# Patient Record
Sex: FEMALE | Race: OTHER | URBAN - METROPOLITAN AREA
[De-identification: names, ages, dates, MRNs, and addresses within clinical notes are randomized per-mention and may not be internally consistent; named-entity substitution may affect disease eponyms.]

---

## 2021-06-10 ENCOUNTER — TELEPHONE (OUTPATIENT)
Dept: FAMILY MEDICINE CLINIC | Age: 31
End: 2021-06-10

## 2021-06-10 ENCOUNTER — VIRTUAL VISIT (OUTPATIENT)
Dept: FAMILY MEDICINE CLINIC | Age: 31
End: 2021-06-10

## 2021-06-10 DIAGNOSIS — Z71.89 COUNSELING AND COORDINATION OF CARE: Primary | ICD-10-CM

## 2021-06-10 DIAGNOSIS — Z53.21 PATIENT LEFT WITHOUT BEING SEEN: Primary | ICD-10-CM

## 2021-06-10 NOTE — PROGRESS NOTES
Patient called [facility] asking for an appointment. Patient has recently moved to the area and did not know about the [facility] process, OW's work cell phone number has been given by a friend. Since there was no answer on the main number, OW called Dolly DELVALLE to ask for assistance to schedule mom and son for a VV.

## 2021-06-10 NOTE — TELEPHONE ENCOUNTER
We tried to reach patient for her appointment. She did not answer, I left a message. She did call back, and stated that she is still at work, and couldn't get off as she thought she would. She was asking for an appointment after 5:00 p.m today for her and her son, or an RX without a visit. She was advised to call another day, when she is able to either come in or do a televisit. I offered different options for the next day or the following week. I also called her twice to offer an evening appointment with Dr. Enedeila Iglesias, but she did not asnwer.

## 2021-06-14 ENCOUNTER — VIRTUAL VISIT (OUTPATIENT)
Dept: FAMILY MEDICINE CLINIC | Age: 31
End: 2021-06-14

## 2021-06-14 DIAGNOSIS — B86 SCABIES: Primary | ICD-10-CM

## 2021-06-14 PROCEDURE — 99442 PR PHYS/QHP TELEPHONE EVALUATION 11-20 MIN: CPT | Performed by: PHYSICIAN ASSISTANT

## 2021-06-14 RX ORDER — PERMETHRIN 50 MG/G
CREAM TOPICAL
Qty: 60 G | Refills: 0 | Status: SHIPPED | OUTPATIENT
Start: 2021-06-14 | End: 2021-06-14 | Stop reason: SDUPTHER

## 2021-06-14 RX ORDER — PERMETHRIN 50 MG/G
CREAM TOPICAL
Qty: 60 G | Refills: 0 | Status: SHIPPED | OUTPATIENT
Start: 2021-06-14 | End: 2021-07-02 | Stop reason: SDUPTHER

## 2021-06-14 RX ORDER — FEXOFENADINE HCL AND PSEUDOEPHEDRINE HCI 60; 120 MG/1; MG/1
1 TABLET, EXTENDED RELEASE ORAL EVERY 12 HOURS
COMMUNITY
End: 2021-07-02

## 2021-06-14 NOTE — PROGRESS NOTES
Check-out Note: Nat,   Please go over goodrx, how to launder toys, bedding, clothes etc    AMN Int # [id number]. Tc to the pt and Scabies treatment and instructions were reviewed with the pt for her and her household. The Pharmacy was changed to [facility] on [address] [facility] due to she lives in [place]. The pt's and her ryan AVS were mailed to the home. Along with the Scabies instruction in [address] [facility]. The pt was texted the [facility] on [facility] address and she was texted the Good Rx coupon.  Daniel Matute RN

## 2021-06-14 NOTE — PROGRESS NOTES
Assessment/Plan:    Diagnoses and all orders for this visit:    1. Scabies  -     permethrin (ACTICIN) 5 % topical cream; apply sparingly as directed. Apply neck down to toes, leave on for 8-14 hours then rinse off        Follow-up and Dispositions    · Return in about 2 weeks (around 2021) for with me for gyn exam at [facility] JESSICA herbert . Nayeli Klein, WILMER Cancino expressed understanding of this plan. An AVS was printed and given to the patient.      ----------------------------------------------------------------------    Chief Complaint   Patient presents with    Rash     follow up    Other     family planning issue       History of Present Illness:  Pt called on doxy me video, consents to appt, was identified by name and   Her son was also seen today     She is calling for a rash that is described as extremely pruritic, affecting her hands, arms and legs that is also affecting her 15 yo son  There is another person living in the house that is not with the rash  No pets in the house  No insects seen in the carpeting of the home   No exposure to plants     She and her son immigrated through immigration 4 months ago. She is working now. She has no health conditions. She has no hx of DM or HTN  She had her last depo mid-February. Then moved to the [address] [place] [address]. Had light period one week ago. Is sexually active. Boyfriend lives separately from her  No pain with intercourse. Never had a pap- will bring her in for exam    She is to take a pregnancy test today before using the scabies treatment if she is concerned about any chance of pregnancy. If positive then she will let me know      No past medical history on file. Current Outpatient Medications   Medication Sig Dispense Refill    fexofenadine-pseudoephedrine (Allegra-D 12 Hour)  mg Tb12 Take 1 Tablet by mouth every twelve (12) hours.  permethrin (ACTICIN) 5 % topical cream apply sparingly as directed.  Apply neck down to toes, leave on for 8-14 hours then rinse off 60 g 0       No Known Allergies    Social History     Tobacco Use    Smoking status: Not on file   Substance Use Topics    Alcohol use: Not on file    Drug use: Not on file       No family history on file. Physical Exam:     There were no vitals taken for this visit.     A&Ox3  WDWN NAD  Respirations normal and non labored  Looks well pleasant  She has very pronounced macular lesions that are all similar in appearance on dorsum of hands and distal forearms and legs  Her son had similar appearing lesions

## 2021-06-23 ENCOUNTER — TELEPHONE (OUTPATIENT)
Dept: FAMILY MEDICINE CLINIC | Age: 31
End: 2021-06-23

## 2021-06-28 ENCOUNTER — TELEPHONE (OUTPATIENT)
Dept: FAMILY MEDICINE CLINIC | Age: 31
End: 2021-06-28

## 2021-06-28 NOTE — TELEPHONE ENCOUNTER
I tried to reach patient to make her aware of the location change for this appointment, no answer, voicemail message left.

## 2021-07-02 ENCOUNTER — OFFICE VISIT (OUTPATIENT)
Dept: FAMILY MEDICINE CLINIC | Age: 31
End: 2021-07-02

## 2021-07-02 VITALS
OXYGEN SATURATION: 100 % | HEART RATE: 74 BPM | WEIGHT: 128 LBS | BODY MASS INDEX: 25.8 KG/M2 | SYSTOLIC BLOOD PRESSURE: 89 MMHG | TEMPERATURE: 98.6 F | DIASTOLIC BLOOD PRESSURE: 55 MMHG | HEIGHT: 59 IN

## 2021-07-02 DIAGNOSIS — B86 SCABIES: ICD-10-CM

## 2021-07-02 DIAGNOSIS — N89.8 VAGINAL DISCHARGE: ICD-10-CM

## 2021-07-02 DIAGNOSIS — R30.0 DYSURIA: Primary | ICD-10-CM

## 2021-07-02 LAB
BILIRUB UR QL STRIP: NEGATIVE
GLUCOSE UR-MCNC: NEGATIVE MG/DL
KETONES P FAST UR STRIP-MCNC: NEGATIVE MG/DL
PH UR STRIP: 8.5 [PH] (ref 4.6–8)
PROT UR QL STRIP: NEGATIVE
SP GR UR STRIP: 1.02 (ref 1–1.03)
UA UROBILINOGEN AMB POC: NORMAL (ref 0.2–1)
URINALYSIS CLARITY POC: CLEAR
URINALYSIS COLOR POC: YELLOW
URINE BLOOD POC: NEGATIVE
URINE LEUKOCYTES POC: ABNORMAL
URINE NITRITES POC: NEGATIVE

## 2021-07-02 PROCEDURE — 99214 OFFICE O/P EST MOD 30 MIN: CPT | Performed by: FAMILY MEDICINE

## 2021-07-02 PROCEDURE — 81002 URINALYSIS NONAUTO W/O SCOPE: CPT | Performed by: FAMILY MEDICINE

## 2021-07-02 RX ORDER — CETIRIZINE HCL 10 MG
10 TABLET ORAL DAILY
Qty: 30 TABLET | Refills: 0 | Status: SHIPPED | OUTPATIENT
Start: 2021-07-02 | End: 2021-08-01

## 2021-07-02 RX ORDER — FLUCONAZOLE 150 MG/1
150 TABLET ORAL DAILY
Qty: 1 TABLET | Refills: 0 | Status: SHIPPED | OUTPATIENT
Start: 2021-07-02 | End: 2021-07-03

## 2021-07-02 RX ORDER — PERMETHRIN 50 MG/G
CREAM TOPICAL
Qty: 60 G | Refills: 0 | Status: SHIPPED | OUTPATIENT
Start: 2021-07-02

## 2021-07-02 RX ORDER — NITROFURANTOIN 25; 75 MG/1; MG/1
100 CAPSULE ORAL 2 TIMES DAILY
Qty: 14 CAPSULE | Refills: 0 | Status: SHIPPED | OUTPATIENT
Start: 2021-07-02 | End: 2021-07-09

## 2021-07-02 NOTE — PROGRESS NOTES
Coordination of Care  1. Have you been to the ER, urgent care clinic since your last visit? Hospitalized since your last visit? No    2. Have you seen or consulted any other health care providers outside of the [address] [place] [address] since your last visit? Include any pap smears or colon screening. No    Does the patient need refills? NO    Learning Assessment Complete?  yes  Depression Screening complete in the past 12 months? yes  Results for orders placed or performed in visit on 07/02/21   AMB POC URINALYSIS DIP STICK MANUAL W/O MICRO   Result Value Ref Range    Color (UA POC) Yellow     Clarity (UA POC) Clear     Glucose (UA POC) Negative Negative    Bilirubin (UA POC) Negative Negative    Ketones (UA POC) Negative Negative    Specific gravity (UA POC) 1.025 1.001 - 1.035    Blood (UA POC) Negative Negative    pH (UA POC) 8.5 (A) 4.6 - 8.0    Protein (UA POC) Negative Negative    Urobilinogen (UA POC) normal 0.2 - 1    Nitrites (UA POC) Negative Negative    Leukocyte esterase (UA POC) Trace Negative

## 2021-07-02 NOTE — PROGRESS NOTES
I have copied the provider's check out note here and have reviewed these items with the patient today:  Check-out Note: -Repeat Scabies treatment x1 with permethrin   -Cetirizine for itching.     - Antibiotics for UTI x 7 days   - Antifungal for yeast infection - one pill, one time. Follow-up as needed. I have printed AVS and reviewed it with patient today. I reviewed the patient's medications with her and she verbalized understanding.  Ian Landaverde RN

## 2021-07-02 NOTE — PROGRESS NOTES
Bree Carlin is a 31 y.o. female   Chief Complaint   Patient presents with    Urinary Pain     pt c/o pain w/ urination, brown discharge    Skin Problem     f/u         ASSESSMENT AND PLAN:    1. Dysuria  Leuk Esterase on urine dip. Treat with macrobid. F/U if symptoms persist.  - AMB POC URINALYSIS DIP STICK MANUAL W/O MICRO  - CHLAMYDIA / GC-AMPLIFIED  - nitrofurantoin, macrocrystal-monohydrate, (Macrobid) 100 mg capsule; Take 1 Capsule by mouth two (2) times a day for 7 days. Indications: bacterial urinary tract infection  Dispense: 14 Capsule; Refill: 0    2. Scabies  PT has new lesions and persistent itching (no new lesions on son)  Repeat treatment x 1  Zyrtec for itching- which may persist 3-4 weeks after treat  - permethrin (ACTICIN) 5 % topical cream; apply sparingly as directed. Apply neck down to toes, leave on for 8-14 hours then rinse off  Dispense: 60 g; Refill: 0  - cetirizine (ZYRTEC) 10 mg tablet; Take 1 Tablet by mouth daily for 30 days. Indications: itching  Dispense: 30 Tablet; Refill: 0    3. Vaginal discharge  Description c/w yeast infection; likely brown due to mixing with spotting from menstruation. Discussed with pt. Will treat for yeast.  Pt will RTC if symptoms persist for a pelvic exam.    - fluconazole (DIFLUCAN) 150 mg tablet; Take 1 Tablet by mouth daily for 1 day. FDA advises cautious prescribing of oral fluconazole in pregnancy. Dispense: 1 Tablet; Refill: 0          SUBJECTIVE:    HPI:  Muriel ELIZABETH Panfilo Osiel is a 31 y.o. female who presents with her son for f/u on Scabies. They were seen virtually. They used the permethrin cream as instructed and washed their clothing and bedding. Her son still has a little itching but no new lesions. She has new lesions and a lot of itching still. She is also having burning with urination and frequency. No urgency. She has a thick brownish vaginal discharge and some vaginal itching.   She uses depo provera for contraception and has irregular periods as a result - usually just spotting. No fevers, no pelvic pain. Wonders if it could be a venereal disease. Review of Systems   Constitutional: Negative for fever and malaise/fatigue. Eyes: Negative for blurred vision. Respiratory: Negative for cough and shortness of breath. Cardiovascular: Negative for chest pain, palpitations and leg swelling. Gastrointestinal: Negative for abdominal pain, constipation, diarrhea, nausea and vomiting. Genitourinary: Positive for dysuria and frequency. Negative for flank pain, hematuria and urgency. Neurological: Negative for dizziness and headaches. BP (!) 89/55 (BP 1 Location: Left upper arm, BP Patient Position: Sitting)   Pulse 74   Temp 98.6 °F (37 °C) (Temporal)   Ht 4' 11.41\" (1.509 m)   Wt 128 lb (58.1 kg)   LMP 06/27/2021   SpO2 100%   BMI 25.50 kg/m²     Physical Exam  Constitutional:       General: She is not in acute distress. Appearance: Normal appearance. HENT:      Head: Normocephalic and atraumatic. Mouth/Throat:      Mouth: Mucous membranes are moist.      Pharynx: Oropharynx is clear. No oropharyngeal exudate or posterior oropharyngeal erythema. Eyes:      Extraocular Movements: Extraocular movements intact. Conjunctiva/sclera: Conjunctivae normal.      Pupils: Pupils are equal, round, and reactive to light. Neck:      Vascular: No carotid bruit. Cardiovascular:      Rate and Rhythm: Normal rate and regular rhythm. Pulses: Normal pulses. Heart sounds: Normal heart sounds. Pulmonary:      Effort: Pulmonary effort is normal.      Breath sounds: Normal breath sounds. Abdominal:      General: Bowel sounds are normal. There is no distension. Palpations: Abdomen is soft. Tenderness: There is no abdominal tenderness. Musculoskeletal:         General: Normal range of motion. Cervical back: No tenderness. Right lower leg: No edema.       Left lower leg: No edema.   Lymphadenopathy:      Cervical: No cervical adenopathy. Skin:     General: Skin is warm. Findings: Rash (erythematous papules on UE LE waist. Concentration at l wrist. Evidence of excoriation.) present. Neurological:      Mental Status: She is alert and oriented to person, place, and time.       Gait: Gait normal.      Deep Tendon Reflexes: Reflexes normal.   Psychiatric:         Behavior: Behavior normal.

## 2022-01-05 ENCOUNTER — TELEPHONE (OUTPATIENT)
Dept: FAMILY MEDICINE CLINIC | Age: 32
End: 2022-01-05

## 2022-01-05 NOTE — TELEPHONE ENCOUNTER
The pt was seen virtually 07/02/21 and was ordered labs for [address] [facility]. The the lab is not collected per the chart. A message was routed to the provider if we may cancel this order.  Gerard Odom RN

## 2023-05-14 RX ORDER — PERMETHRIN 50 MG/G
CREAM TOPICAL
COMMUNITY
Start: 2021-07-02